# Patient Record
Sex: FEMALE | Race: WHITE | NOT HISPANIC OR LATINO
[De-identification: names, ages, dates, MRNs, and addresses within clinical notes are randomized per-mention and may not be internally consistent; named-entity substitution may affect disease eponyms.]

---

## 2021-05-05 PROBLEM — Z86.79 HISTORY OF HYPERTENSION: Status: RESOLVED | Noted: 2021-05-05 | Resolved: 2021-05-05

## 2021-05-05 RX ORDER — ASPIRIN 81 MG
81 TABLET, DELAYED RELEASE (ENTERIC COATED) ORAL
Refills: 0 | Status: ACTIVE | COMMUNITY

## 2021-05-05 RX ORDER — HYDROCHLOROTHIAZIDE 25 MG/1
25 TABLET ORAL
Refills: 0 | Status: ACTIVE | COMMUNITY

## 2021-05-05 RX ORDER — LANSOPRAZOLE 30 MG/1
30 CAPSULE, DELAYED RELEASE ORAL
Refills: 0 | Status: ACTIVE | COMMUNITY

## 2021-05-07 ENCOUNTER — APPOINTMENT (OUTPATIENT)
Dept: CARDIOTHORACIC SURGERY | Facility: CLINIC | Age: 56
End: 2021-05-07
Payer: COMMERCIAL

## 2021-05-07 ENCOUNTER — NON-APPOINTMENT (OUTPATIENT)
Age: 56
End: 2021-05-07

## 2021-05-07 VITALS
SYSTOLIC BLOOD PRESSURE: 120 MMHG | HEIGHT: 61 IN | TEMPERATURE: 97.7 F | WEIGHT: 163 LBS | DIASTOLIC BLOOD PRESSURE: 70 MMHG | BODY MASS INDEX: 30.78 KG/M2

## 2021-05-07 DIAGNOSIS — Z86.79 PERSONAL HISTORY OF OTHER DISEASES OF THE CIRCULATORY SYSTEM: ICD-10-CM

## 2021-05-07 DIAGNOSIS — Z00.00 ENCOUNTER FOR GENERAL ADULT MEDICAL EXAMINATION W/OUT ABNORMAL FINDINGS: ICD-10-CM

## 2021-05-07 PROCEDURE — 93000 ELECTROCARDIOGRAM COMPLETE: CPT

## 2021-05-07 PROCEDURE — 99072 ADDL SUPL MATRL&STAF TM PHE: CPT

## 2021-05-07 PROCEDURE — 99204 OFFICE O/P NEW MOD 45 MIN: CPT

## 2021-05-11 NOTE — HISTORY OF PRESENT ILLNESS
[FreeTextEntry1] : 55 year old female, with PMHx HTN, presented to cardiologist Dr. 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 She is referred for consultation by Dr. Gomez. She reports SOB with exertion, occasional chest pain with walking a flight of stairs or a long hallway over the past few months. She also endorses leg swelling and dizziness. She denies recent falls. Workup revealed stress echocardiography that revealed Jordan protocol for [id number], 7.5METS, terminated due to dyspnea, -2mm horizaontal to downsloping ST depressions, with nml LV function/regional wall motion abnormalities. TTE revealed nml cardiac structure, nml LV/RV size and function, no valvular pathology, unable to assess PA pressure due to lack of TR. Coronary CTA showed a small amount of calcification in the proximal LAD without HD significant stenosis, nml remainder of coronary arteries, 1.4cm area of mid LAD myocardial bridge. 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 She lives in a house with her  in [place] and has plans to move to a new house in [facility]. She remains independent with all ADLs.

## 2021-05-11 NOTE — PROCEDURE
[FreeTextEntry1] : 55 year old female, with PMHx HTN who presents with exertional SOB/chest discomfort, palpitations. Workup revealed stress echocardiography that revealed Jordan protocol for [id number], 7.5METS, terminated due to dyspnea, -2mm horizaontal to downsloping ST depressions, with nml LV function/regional wall motion abnormalities. TTE revealed nml cardiac structure, nml LV/RV size and function, no valvular pathology, unable to assess PA pressure due to lack of TR. Coronary CTA showed a small amount of calcification in the proximal LAD without HD significant stenosis, nml remainder of coronary arteries, 1.4cm area of mid LAD myocardial bridge. Presence of mid LAD myocardial bridge is identified on CTA without physiological evidence of ischemia on stress echo. She remains limited but unclear if related to current findings. \par -will send CTA coronary for FFR analysis as a second evaluation for physiological significance\par -MCOT ordered for evaluation of palpitation and monitoring of HR\par -if HR allows, would consider holding losartan and titrate dose of BB\par -pulmonary evaluation\par -f/u after above; will discuss further with referring cardiologist/PMD\par \par I personally performed the services described in the documentation and reviewed the documented recorded by the scribe in my presence; it accurately and completely records my words and actions.

## 2021-06-01 ENCOUNTER — APPOINTMENT (OUTPATIENT)
Dept: HEART AND VASCULAR | Facility: CLINIC | Age: 56
End: 2021-06-01
Payer: COMMERCIAL

## 2021-06-01 PROCEDURE — 99072 ADDL SUPL MATRL&STAF TM PHE: CPT

## 2021-06-01 PROCEDURE — 93228 REMOTE 30 DAY ECG REV/REPORT: CPT

## 2021-06-15 ENCOUNTER — RESULT REVIEW (OUTPATIENT)
Age: 56
End: 2021-06-15

## 2021-06-16 ENCOUNTER — OUTPATIENT (OUTPATIENT)
Dept: OUTPATIENT SERVICES | Facility: HOSPITAL | Age: 56
LOS: 1 days | End: 2021-06-16
Payer: COMMERCIAL

## 2021-06-16 PROCEDURE — 0502T: CPT

## 2021-06-16 PROCEDURE — 0503T: CPT

## 2021-06-16 PROCEDURE — 0504T: CPT

## 2021-07-02 ENCOUNTER — APPOINTMENT (OUTPATIENT)
Dept: CARDIOTHORACIC SURGERY | Facility: CLINIC | Age: 56
End: 2021-07-02
Payer: COMMERCIAL

## 2021-07-02 PROCEDURE — 99443: CPT

## 2021-07-08 NOTE — HISTORY OF PRESENT ILLNESS
[FreeTextEntry1] : \par This visit was provided via telehealth using real-time 2-way audio visual technology. The patient, LOVELY REILLY, was located at home, [address] [place] [address] , at the time of the visit. The provider was located at [address] [place] [address]. The patient, LOVELY REILLY, Dr. Matt Laura all participated in the telehealth encounter. Verbal consent given on 07/2/2021 by LOVELY REILLY.\par \par \par \par 55 year old female, with PMHx HTN, myocardial bridge who presents for follow up after testing

## 2021-07-08 NOTE — REVIEW OF SYSTEMS
[Shortness Of Breath] : shortness of breath [Negative] : Heme/Lymph [FreeTextEntry5] : expertional chest discomfort, occasional palpitations

## 2021-09-01 ENCOUNTER — NON-APPOINTMENT (OUTPATIENT)
Age: 56
End: 2021-09-01

## 2021-11-16 ENCOUNTER — NON-APPOINTMENT (OUTPATIENT)
Age: 56
End: 2021-11-16

## 2021-11-16 VITALS
HEART RATE: 61 BPM | SYSTOLIC BLOOD PRESSURE: 145 MMHG | RESPIRATION RATE: 19 BRPM | WEIGHT: 149.91 LBS | HEIGHT: 61 IN | TEMPERATURE: 99 F | DIASTOLIC BLOOD PRESSURE: 69 MMHG | OXYGEN SATURATION: 99 %

## 2021-11-17 ENCOUNTER — INPATIENT (INPATIENT)
Facility: HOSPITAL | Age: 56
LOS: 0 days | Discharge: HOME | DRG: 287 | End: 2021-11-18
Attending: INTERNAL MEDICINE | Admitting: INTERNAL MEDICINE
Payer: COMMERCIAL

## 2021-11-17 ENCOUNTER — APPOINTMENT (OUTPATIENT)
Age: 56
End: 2021-11-17

## 2021-11-17 DIAGNOSIS — Z96.659 PRESENCE OF UNSPECIFIED ARTIFICIAL KNEE JOINT: Chronic | ICD-10-CM

## 2021-11-17 DIAGNOSIS — Z90.49 ACQUIRED ABSENCE OF OTHER SPECIFIED PARTS OF DIGESTIVE TRACT: Chronic | ICD-10-CM

## 2021-11-17 DIAGNOSIS — Z90.722 ACQUIRED ABSENCE OF OVARIES, BILATERAL: Chronic | ICD-10-CM

## 2021-11-17 DIAGNOSIS — Z90.3 ACQUIRED ABSENCE OF STOMACH [PART OF]: Chronic | ICD-10-CM

## 2021-11-17 DIAGNOSIS — Z90.710 ACQUIRED ABSENCE OF BOTH CERVIX AND UTERUS: Chronic | ICD-10-CM

## 2021-11-17 DIAGNOSIS — Z98.891 HISTORY OF UTERINE SCAR FROM PREVIOUS SURGERY: Chronic | ICD-10-CM

## 2021-11-17 DIAGNOSIS — Z98.890 OTHER SPECIFIED POSTPROCEDURAL STATES: Chronic | ICD-10-CM

## 2021-11-17 LAB
ALBUMIN SERPL ELPH-MCNC: 4 G/DL — SIGNIFICANT CHANGE UP (ref 3.3–5)
ALBUMIN SERPL ELPH-MCNC: 4.8 G/DL — SIGNIFICANT CHANGE UP (ref 3.3–5)
ALP SERPL-CCNC: 100 U/L — SIGNIFICANT CHANGE UP (ref 40–120)
ALP SERPL-CCNC: 83 U/L — SIGNIFICANT CHANGE UP (ref 40–120)
ALT FLD-CCNC: 11 U/L — SIGNIFICANT CHANGE UP (ref 10–45)
ALT FLD-CCNC: 9 U/L — LOW (ref 10–45)
ANION GAP SERPL CALC-SCNC: 14 MMOL/L — SIGNIFICANT CHANGE UP (ref 5–17)
ANION GAP SERPL CALC-SCNC: 14 MMOL/L — SIGNIFICANT CHANGE UP (ref 5–17)
APTT BLD: 32.4 SEC — SIGNIFICANT CHANGE UP (ref 27.5–35.5)
APTT BLD: 34.2 SEC — SIGNIFICANT CHANGE UP (ref 27.5–35.5)
AST SERPL-CCNC: 13 U/L — SIGNIFICANT CHANGE UP (ref 10–40)
AST SERPL-CCNC: 18 U/L — SIGNIFICANT CHANGE UP (ref 10–40)
BILIRUB SERPL-MCNC: 0.6 MG/DL — SIGNIFICANT CHANGE UP (ref 0.2–1.2)
BILIRUB SERPL-MCNC: 0.6 MG/DL — SIGNIFICANT CHANGE UP (ref 0.2–1.2)
BLD GP AB SCN SERPL QL: NEGATIVE — SIGNIFICANT CHANGE UP
BUN SERPL-MCNC: 11 MG/DL — SIGNIFICANT CHANGE UP (ref 7–23)
BUN SERPL-MCNC: 12 MG/DL — SIGNIFICANT CHANGE UP (ref 7–23)
CALCIUM SERPL-MCNC: 10.1 MG/DL — SIGNIFICANT CHANGE UP (ref 8.4–10.5)
CALCIUM SERPL-MCNC: 9.2 MG/DL — SIGNIFICANT CHANGE UP (ref 8.4–10.5)
CHLORIDE SERPL-SCNC: 101 MMOL/L — SIGNIFICANT CHANGE UP (ref 96–108)
CHLORIDE SERPL-SCNC: 104 MMOL/L — SIGNIFICANT CHANGE UP (ref 96–108)
CO2 SERPL-SCNC: 23 MMOL/L — SIGNIFICANT CHANGE UP (ref 22–31)
CO2 SERPL-SCNC: 26 MMOL/L — SIGNIFICANT CHANGE UP (ref 22–31)
CREAT SERPL-MCNC: 0.81 MG/DL — SIGNIFICANT CHANGE UP (ref 0.5–1.3)
CREAT SERPL-MCNC: 0.81 MG/DL — SIGNIFICANT CHANGE UP (ref 0.5–1.3)
GLUCOSE SERPL-MCNC: 101 MG/DL — HIGH (ref 70–99)
GLUCOSE SERPL-MCNC: 95 MG/DL — SIGNIFICANT CHANGE UP (ref 70–99)
HCT VFR BLD CALC: 32.5 % — LOW (ref 34.5–45)
HCT VFR BLD CALC: 38.1 % — SIGNIFICANT CHANGE UP (ref 34.5–45)
HGB BLD-MCNC: 11 G/DL — LOW (ref 11.5–15.5)
HGB BLD-MCNC: 12.8 G/DL — SIGNIFICANT CHANGE UP (ref 11.5–15.5)
INR BLD: 1 — SIGNIFICANT CHANGE UP (ref 0.88–1.16)
INR BLD: 1.09 — SIGNIFICANT CHANGE UP (ref 0.88–1.16)
MAGNESIUM SERPL-MCNC: 1.9 MG/DL — SIGNIFICANT CHANGE UP (ref 1.6–2.6)
MAGNESIUM SERPL-MCNC: 2 MG/DL — SIGNIFICANT CHANGE UP (ref 1.6–2.6)
MCHC RBC-ENTMCNC: 28.9 PG — SIGNIFICANT CHANGE UP (ref 27–34)
MCHC RBC-ENTMCNC: 29.1 PG — SIGNIFICANT CHANGE UP (ref 27–34)
MCHC RBC-ENTMCNC: 33.6 GM/DL — SIGNIFICANT CHANGE UP (ref 32–36)
MCHC RBC-ENTMCNC: 33.8 GM/DL — SIGNIFICANT CHANGE UP (ref 32–36)
MCV RBC AUTO: 86 FL — SIGNIFICANT CHANGE UP (ref 80–100)
MCV RBC AUTO: 86 FL — SIGNIFICANT CHANGE UP (ref 80–100)
NRBC # BLD: 0 /100 WBCS — SIGNIFICANT CHANGE UP (ref 0–0)
NRBC # BLD: 0 /100 WBCS — SIGNIFICANT CHANGE UP (ref 0–0)
NT-PROBNP SERPL-SCNC: 103 PG/ML — SIGNIFICANT CHANGE UP (ref 0–300)
PHOSPHATE SERPL-MCNC: 3.7 MG/DL — SIGNIFICANT CHANGE UP (ref 2.5–4.5)
PHOSPHATE SERPL-MCNC: 4 MG/DL — SIGNIFICANT CHANGE UP (ref 2.5–4.5)
PLATELET # BLD AUTO: 202 K/UL — SIGNIFICANT CHANGE UP (ref 150–400)
PLATELET # BLD AUTO: 250 K/UL — SIGNIFICANT CHANGE UP (ref 150–400)
POTASSIUM SERPL-MCNC: 3.3 MMOL/L — LOW (ref 3.5–5.3)
POTASSIUM SERPL-MCNC: 3.7 MMOL/L — SIGNIFICANT CHANGE UP (ref 3.5–5.3)
POTASSIUM SERPL-SCNC: 3.3 MMOL/L — LOW (ref 3.5–5.3)
POTASSIUM SERPL-SCNC: 3.7 MMOL/L — SIGNIFICANT CHANGE UP (ref 3.5–5.3)
PROT SERPL-MCNC: 6.4 G/DL — SIGNIFICANT CHANGE UP (ref 6–8.3)
PROT SERPL-MCNC: 7.8 G/DL — SIGNIFICANT CHANGE UP (ref 6–8.3)
PROTHROM AB SERPL-ACNC: 12 SEC — SIGNIFICANT CHANGE UP (ref 10.6–13.6)
PROTHROM AB SERPL-ACNC: 13 SEC — SIGNIFICANT CHANGE UP (ref 10.6–13.6)
RBC # BLD: 3.78 M/UL — LOW (ref 3.8–5.2)
RBC # BLD: 4.43 M/UL — SIGNIFICANT CHANGE UP (ref 3.8–5.2)
RBC # FLD: 13 % — SIGNIFICANT CHANGE UP (ref 10.3–14.5)
RBC # FLD: 13 % — SIGNIFICANT CHANGE UP (ref 10.3–14.5)
RH IG SCN BLD-IMP: POSITIVE — SIGNIFICANT CHANGE UP
SODIUM SERPL-SCNC: 141 MMOL/L — SIGNIFICANT CHANGE UP (ref 135–145)
SODIUM SERPL-SCNC: 141 MMOL/L — SIGNIFICANT CHANGE UP (ref 135–145)
WBC # BLD: 5.56 K/UL — SIGNIFICANT CHANGE UP (ref 3.8–10.5)
WBC # BLD: 6.36 K/UL — SIGNIFICANT CHANGE UP (ref 3.8–10.5)
WBC # FLD AUTO: 5.56 K/UL — SIGNIFICANT CHANGE UP (ref 3.8–10.5)
WBC # FLD AUTO: 6.36 K/UL — SIGNIFICANT CHANGE UP (ref 3.8–10.5)

## 2021-11-17 PROCEDURE — 71045 X-RAY EXAM CHEST 1 VIEW: CPT | Mod: 26

## 2021-11-17 PROCEDURE — 93460 R&L HRT ART/VENTRICLE ANGIO: CPT | Mod: 26

## 2021-11-17 PROCEDURE — 99223 1ST HOSP IP/OBS HIGH 75: CPT | Mod: 25

## 2021-11-17 RX ORDER — METOPROLOL TARTRATE 50 MG
50 TABLET ORAL DAILY
Refills: 0 | Status: DISCONTINUED | OUTPATIENT
Start: 2021-11-17 | End: 2021-11-18

## 2021-11-17 RX ORDER — ACETAMINOPHEN 500 MG
650 TABLET ORAL EVERY 6 HOURS
Refills: 0 | Status: DISCONTINUED | OUTPATIENT
Start: 2021-11-17 | End: 2021-11-18

## 2021-11-17 RX ORDER — ASPIRIN/CALCIUM CARB/MAGNESIUM 324 MG
81 TABLET ORAL DAILY
Refills: 0 | Status: DISCONTINUED | OUTPATIENT
Start: 2021-11-17 | End: 2021-11-18

## 2021-11-17 RX ORDER — ASPIRIN/CALCIUM CARB/MAGNESIUM 324 MG
1 TABLET ORAL
Qty: 0 | Refills: 0 | DISCHARGE

## 2021-11-17 RX ORDER — PANTOPRAZOLE SODIUM 20 MG/1
40 TABLET, DELAYED RELEASE ORAL
Refills: 0 | Status: DISCONTINUED | OUTPATIENT
Start: 2021-11-17 | End: 2021-11-18

## 2021-11-17 RX ORDER — HEPARIN SODIUM 5000 [USP'U]/ML
5000 INJECTION INTRAVENOUS; SUBCUTANEOUS EVERY 8 HOURS
Refills: 0 | Status: DISCONTINUED | OUTPATIENT
Start: 2021-11-17 | End: 2021-11-18

## 2021-11-17 RX ORDER — ASPIRIN/CALCIUM CARB/MAGNESIUM 324 MG
324 TABLET ORAL ONCE
Refills: 0 | Status: COMPLETED | OUTPATIENT
Start: 2021-11-17 | End: 2021-11-17

## 2021-11-17 RX ORDER — LANSOPRAZOLE 15 MG/1
30 CAPSULE, DELAYED RELEASE ORAL
Qty: 0 | Refills: 0 | DISCHARGE

## 2021-11-17 RX ORDER — METOPROLOL TARTRATE 50 MG
1 TABLET ORAL
Qty: 0 | Refills: 0 | DISCHARGE

## 2021-11-17 RX ORDER — SODIUM CHLORIDE 9 MG/ML
1000 INJECTION, SOLUTION INTRAVENOUS
Refills: 0 | Status: DISCONTINUED | OUTPATIENT
Start: 2021-11-17 | End: 2021-11-18

## 2021-11-17 RX ORDER — ONDANSETRON 8 MG/1
4 TABLET, FILM COATED ORAL ONCE
Refills: 0 | Status: DISCONTINUED | OUTPATIENT
Start: 2021-11-17 | End: 2021-11-18

## 2021-11-17 RX ADMIN — Medication 324 MILLIGRAM(S): at 19:26

## 2021-11-17 NOTE — H&P ADULT - NSICDXPASTSURGICALHX_GEN_ALL_CORE_FT
PAST SURGICAL HISTORY:  H/O bilateral oophorectomy     H/O gastric sleeve     H/O total knee replacement     History of ankle surgery     S/P  section     S/P cholecystectomy     S/P SHAMA (total abdominal hysterectomy)

## 2021-11-17 NOTE — H&P ADULT - NSHPREVIEWOFSYSTEMS_GEN_ALL_CORE
Review of Systems  CONSTITUTIONAL:  Denies Fevers / chills, sweats, fatigue, weight loss, weight gain                                      NEURO:  Denies paresthesias, seizures, syncope, confusion                                                                                EYES:  Denies Blurry vision, discharge, pain, loss of vision                                                                                    ENMT:  Denies Difficulty hearing, vertigo, dysphagia, epistaxis, recent dental work                                       CV:  Denies Chest pain, palpitations, ALBARADO, orthopnea                                                                                          RESPIRATORY:  Denies Wheezing, SOB, cough / sputum, hemoptysis                                                                GI:  Denies Nausea, vomiting, diarrhea, constipation, melena, difficulty swallowing                                               : Denies Hematuria, dysuria, urgency, incontinence                                                                                         MUSCULOSKELETAL:  Denies arthritis, joint swelling, muscle weakness                                                             SKIN/BREAST:  Denies rash, itching, hair loss, masses                                                                                            PSYCH:  Denies depression, anxiety, suicidal ideation                                                                                               HEME/LYMPH:  Denies bruises easily, enlarged lymph nodes, tender lymph nodes                                        ENDOCRINE:  Denies cold intolerance, heat intolerance, polydipsia

## 2021-11-17 NOTE — H&P ADULT - NSHPPHYSICALEXAM_GEN_ALL_CORE
Physical Exam  VS Afebrile, /55, HR 88 reg, 100% RA   CONSTITUTIONAL:                                                              NAD  NEURO:                                                                     A&O x 3                  EYES:                                                                                WNL  ENMT:                                                                               WNL  CV:                                                                                  RRR   RESPIRATORY:                                                                 CTA bilaterally   GI:                                                                                    soft, non-tender   : [place] + / -                                                                  WNL  MUSCULOSKELETAL:                                                       WNL  SKIN / BREAST:                                                                  WNL

## 2021-11-17 NOTE — BRIEF OPERATIVE NOTE - OPERATION/FINDINGS
radial cath - radial band to be d/c at midnight   no CAD - normal coronaries   normal PA pressures  small myocardial bridge   f/u with Keya   d/c home tomorrow

## 2021-11-17 NOTE — H&P ADULT - ASSESSMENT
56 y/o female, PMHx HTN, presented to cardiologist, Dr. Tessa Stockton for evaluation of SOB, LE edema, and coronary CTA results of myocardial bridge in the mid LAD. Pt referred for consultation by Dr. Gomez. Pt reports SOB with exertion, chest pain with stairs and walking x few months. Pt c/o LE edema and dizziness. Workup revealed stress echocardiography that revealed Jordan protocol for 6min 14 sec 7.5 METS, terminated due to dyspnea, -2mm horizontal to downsloping ST depressions, with normal LV function/regional wall motion abnormalities. TTE revealed normal cardiac structure, normal LV/RV size and function, no valvular pathology, unable to assess PA pressure due to lack of TR. Coronary CTA showed small amount of calcifications in the proximal LAD without HD significant stenosis, normal remainder of coronary arteries, 1.4cm area of mid LAD myocardial bridging.   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    Plan:  Problem 1: Myocardial Bridge   - R/LHC today, FFR of LAD, +/- Provocative Test   - Continue ASA/BB after procedure     Problem 2: HTN  - Resume HCTZ, Losartan, Metoprolol post op       Problem 3: GERD  - restart home Lansoprazole    I have reviewed clinical labs tests and reports, radiology tests and reports, as well as old patient medical records, and discussed with the referring physician.

## 2021-11-17 NOTE — H&P ADULT - HISTORY OF PRESENT ILLNESS
54 y/o female, PMHx HTN, presented to cardiologist, Dr. Tessa Stockton for evaluation of SOB, LE edema, and coronary CTA results of myocardial bridge in the mid LAD. Pt referred for consultation by Dr. Gomez. Pt reports SOB with exertion, chest pain with stairs and walking x few months. Pt c/o LE edema and dizziness. Workup revealed stress echocardiography that revealed Jordan protocol for 6min 14 sec 7.5 METS, terminated due to dyspnea, -2mm horizontal to downsloping ST depressions, with normal LV function/regional wall motion abnormalities. TTE revealed normal cardiac structure, normal LV/RV size and function, no valvular pathology, unable to assess PA pressure due to lack of TR. Coronary CTA showed small amount of calcifications in the proximal LAD without HD significant stenosis, normal remainder of coronary arteries, 1.4cm area of mid LAD myocardial bridging.   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    Pt seen/examined day of procedure. Due to OR delays and case starting later than planned, patient will be admitted to [facility] overnight and discharged in the morning. Pt has been NPO since midnight. No recent fevers, chills, illnesses, rashes. Pt feels well.  No chest pain, SOB.

## 2021-11-17 NOTE — BRIEF OPERATIVE NOTE - NSICDXBRIEFPROCEDURE_GEN_ALL_CORE_FT
PROCEDURES:  Left and right heart catheterization 17-Nov-2021 21:31:44  Néstor Mayo  Fractional flow reserve (FFR) measurement of initial coronary vessel 17-Nov-2021 21:31:55  Néstor Mayo

## 2021-11-17 NOTE — H&P ADULT - ATTENDING COMMENTS
Patient seen and examined with PA/fellow bedside and discussed during rounds.  PA/fellow note read, including vitals, physical findings, laboratory data, and radiological reports.   Revisions included below. Direct personal management at bedside and extensive interpretation of the data performed.  Plan was outlined and discussed in details with the multidisciplinary team.  Decision making of high complexity.     54 y/o female, PMHx HTN, presented to cardiologist, Dr. Tessa Stockton for evaluation of SOB, LE edema, and coronary CTA results of myocardial bridge in the mid LAD. Pt reports SOB with exertion, chest pain with stairs and walking x few months. Pt c/o LE edema and dizziness. Workup revealed stress echocardiography that revealed Jordan protocol for 6min 14 sec 7.5 METS, terminated due to dyspnea, -2mm horizontal to downsloping ST depressions, with normal LV function/regional wall motion abnormalities. TTE revealed normal cardiac structure, normal LV/RV size and function, no valvular pathology, unable to assess PA pressure due to lack of TR. Coronary CTA showed small amount of calcifications in the proximal LAD without HD significant stenosis, normal remainder of coronary arteries, 1.4cm area of mid LAD myocardial bridging. Patient presents for cardiac catheterization given continued sx. Discussed risks/benefits/alternatives to cardiac cath with possible FFR/provacative testing, informed consent obtained.    I was physically present for the key portions of the evaluation and management (E/M) service provided.  I agree with the above history, physical, and plan which I have reviewed and edited where appropriate.     80 minutes spent on total encounter; more than 50% of the visit was spent counseling and/or coordinating care by the attending physician.

## 2021-11-17 NOTE — H&P ADULT - NSHPSOCIALHISTORY_GEN_ALL_CORE
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

## 2021-11-18 ENCOUNTER — TRANSCRIPTION ENCOUNTER (OUTPATIENT)
Age: 56
End: 2021-11-18

## 2021-11-18 VITALS — TEMPERATURE: 98 F

## 2021-11-18 LAB
ALBUMIN SERPL ELPH-MCNC: 4.2 G/DL — SIGNIFICANT CHANGE UP (ref 3.3–5)
ALP SERPL-CCNC: 80 U/L — SIGNIFICANT CHANGE UP (ref 40–120)
ALT FLD-CCNC: 9 U/L — LOW (ref 10–45)
ANION GAP SERPL CALC-SCNC: 10 MMOL/L — SIGNIFICANT CHANGE UP (ref 5–17)
APTT BLD: 31.7 SEC — SIGNIFICANT CHANGE UP (ref 27.5–35.5)
AST SERPL-CCNC: 12 U/L — SIGNIFICANT CHANGE UP (ref 10–40)
BILIRUB SERPL-MCNC: 0.6 MG/DL — SIGNIFICANT CHANGE UP (ref 0.2–1.2)
BUN SERPL-MCNC: 13 MG/DL — SIGNIFICANT CHANGE UP (ref 7–23)
CALCIUM SERPL-MCNC: 9.2 MG/DL — SIGNIFICANT CHANGE UP (ref 8.4–10.5)
CHLORIDE SERPL-SCNC: 104 MMOL/L — SIGNIFICANT CHANGE UP (ref 96–108)
CO2 SERPL-SCNC: 26 MMOL/L — SIGNIFICANT CHANGE UP (ref 22–31)
COVID-19 NUCLEOCAPSID GAM AB INTERP: NEGATIVE — SIGNIFICANT CHANGE UP
COVID-19 NUCLEOCAPSID TOTAL GAM ANTIBODY RESULT: 0.08 INDEX — SIGNIFICANT CHANGE UP
COVID-19 SPIKE DOMAIN AB INTERP: POSITIVE
COVID-19 SPIKE DOMAIN ANTIBODY RESULT: 167 U/ML — HIGH
CREAT SERPL-MCNC: 0.82 MG/DL — SIGNIFICANT CHANGE UP (ref 0.5–1.3)
GLUCOSE SERPL-MCNC: 93 MG/DL — SIGNIFICANT CHANGE UP (ref 70–99)
HCT VFR BLD CALC: 33.6 % — LOW (ref 34.5–45)
HCV AB S/CO SERPL IA: 0.04 S/CO — SIGNIFICANT CHANGE UP
HCV AB SERPL-IMP: SIGNIFICANT CHANGE UP
HGB BLD-MCNC: 10.8 G/DL — LOW (ref 11.5–15.5)
INR BLD: 1.09 — SIGNIFICANT CHANGE UP (ref 0.88–1.16)
MAGNESIUM SERPL-MCNC: 2 MG/DL — SIGNIFICANT CHANGE UP (ref 1.6–2.6)
MCHC RBC-ENTMCNC: 28 PG — SIGNIFICANT CHANGE UP (ref 27–34)
MCHC RBC-ENTMCNC: 32.1 GM/DL — SIGNIFICANT CHANGE UP (ref 32–36)
MCV RBC AUTO: 87 FL — SIGNIFICANT CHANGE UP (ref 80–100)
NRBC # BLD: 0 /100 WBCS — SIGNIFICANT CHANGE UP (ref 0–0)
NT-PROBNP SERPL-SCNC: 83 PG/ML — SIGNIFICANT CHANGE UP (ref 0–300)
PHOSPHATE SERPL-MCNC: 4.5 MG/DL — SIGNIFICANT CHANGE UP (ref 2.5–4.5)
PLATELET # BLD AUTO: 202 K/UL — SIGNIFICANT CHANGE UP (ref 150–400)
POTASSIUM SERPL-MCNC: 3.8 MMOL/L — SIGNIFICANT CHANGE UP (ref 3.5–5.3)
POTASSIUM SERPL-SCNC: 3.8 MMOL/L — SIGNIFICANT CHANGE UP (ref 3.5–5.3)
PROT SERPL-MCNC: 6.4 G/DL — SIGNIFICANT CHANGE UP (ref 6–8.3)
PROTHROM AB SERPL-ACNC: 13 SEC — SIGNIFICANT CHANGE UP (ref 10.6–13.6)
RBC # BLD: 3.86 M/UL — SIGNIFICANT CHANGE UP (ref 3.8–5.2)
RBC # FLD: 13.2 % — SIGNIFICANT CHANGE UP (ref 10.3–14.5)
SARS-COV-2 IGG+IGM SERPL QL IA: 0.08 INDEX — SIGNIFICANT CHANGE UP
SARS-COV-2 IGG+IGM SERPL QL IA: 167 U/ML — HIGH
SARS-COV-2 IGG+IGM SERPL QL IA: NEGATIVE — SIGNIFICANT CHANGE UP
SARS-COV-2 IGG+IGM SERPL QL IA: POSITIVE
SODIUM SERPL-SCNC: 140 MMOL/L — SIGNIFICANT CHANGE UP (ref 135–145)
WBC # BLD: 4.53 K/UL — SIGNIFICANT CHANGE UP (ref 3.8–10.5)
WBC # FLD AUTO: 4.53 K/UL — SIGNIFICANT CHANGE UP (ref 3.8–10.5)

## 2021-11-18 PROCEDURE — 85610 PROTHROMBIN TIME: CPT

## 2021-11-18 PROCEDURE — 83880 ASSAY OF NATRIURETIC PEPTIDE: CPT

## 2021-11-18 PROCEDURE — 36415 COLL VENOUS BLD VENIPUNCTURE: CPT

## 2021-11-18 PROCEDURE — 80053 COMPREHEN METABOLIC PANEL: CPT

## 2021-11-18 PROCEDURE — C1894: CPT

## 2021-11-18 PROCEDURE — 84100 ASSAY OF PHOSPHORUS: CPT

## 2021-11-18 PROCEDURE — 99231 SBSQ HOSP IP/OBS SF/LOW 25: CPT

## 2021-11-18 PROCEDURE — 99238 HOSP IP/OBS DSCHRG MGMT 30/<: CPT

## 2021-11-18 PROCEDURE — 86850 RBC ANTIBODY SCREEN: CPT

## 2021-11-18 PROCEDURE — C1889: CPT

## 2021-11-18 PROCEDURE — 85730 THROMBOPLASTIN TIME PARTIAL: CPT

## 2021-11-18 PROCEDURE — 86803 HEPATITIS C AB TEST: CPT

## 2021-11-18 PROCEDURE — C1769: CPT

## 2021-11-18 PROCEDURE — 86901 BLOOD TYPING SEROLOGIC RH(D): CPT

## 2021-11-18 PROCEDURE — 86769 SARS-COV-2 COVID-19 ANTIBODY: CPT

## 2021-11-18 PROCEDURE — 86900 BLOOD TYPING SEROLOGIC ABO: CPT

## 2021-11-18 PROCEDURE — 85027 COMPLETE CBC AUTOMATED: CPT

## 2021-11-18 PROCEDURE — 83735 ASSAY OF MAGNESIUM: CPT

## 2021-11-18 PROCEDURE — C1887: CPT

## 2021-11-18 PROCEDURE — 71045 X-RAY EXAM CHEST 1 VIEW: CPT

## 2021-11-18 RX ORDER — MAGNESIUM OXIDE 400 MG ORAL TABLET 241.3 MG
400 TABLET ORAL ONCE
Refills: 0 | Status: COMPLETED | OUTPATIENT
Start: 2021-11-18 | End: 2021-11-18

## 2021-11-18 RX ORDER — ACETAMINOPHEN 500 MG
1000 TABLET ORAL ONCE
Refills: 0 | Status: COMPLETED | OUTPATIENT
Start: 2021-11-18 | End: 2021-11-18

## 2021-11-18 RX ORDER — POTASSIUM CHLORIDE 20 MEQ
40 PACKET (EA) ORAL ONCE
Refills: 0 | Status: COMPLETED | OUTPATIENT
Start: 2021-11-18 | End: 2021-11-18

## 2021-11-18 RX ADMIN — SODIUM CHLORIDE 75 MILLILITER(S): 9 INJECTION, SOLUTION INTRAVENOUS at 01:09

## 2021-11-18 RX ADMIN — Medication 1000 MILLIGRAM(S): at 08:07

## 2021-11-18 RX ADMIN — Medication 400 MILLIGRAM(S): at 06:44

## 2021-11-18 RX ADMIN — MAGNESIUM OXIDE 400 MG ORAL TABLET 400 MILLIGRAM(S): 241.3 TABLET ORAL at 00:53

## 2021-11-18 RX ADMIN — Medication 40 MILLIEQUIVALENT(S): at 00:53

## 2021-11-18 RX ADMIN — HEPARIN SODIUM 5000 UNIT(S): 5000 INJECTION INTRAVENOUS; SUBCUTANEOUS at 06:44

## 2021-11-18 RX ADMIN — Medication 50 MILLIGRAM(S): at 06:45

## 2021-11-18 RX ADMIN — HEPARIN SODIUM 5000 UNIT(S): 5000 INJECTION INTRAVENOUS; SUBCUTANEOUS at 00:48

## 2021-11-18 RX ADMIN — PANTOPRAZOLE SODIUM 40 MILLIGRAM(S): 20 TABLET, DELAYED RELEASE ORAL at 06:45

## 2021-11-18 NOTE — DISCHARGE NOTE NURSING/CASE MANAGEMENT/SOCIAL WORK - PATIENT PORTAL LINK FT
You can access the FollowMyHealth Patient Portal offered by [facility] by registering at the following website: [email or web address]. By joining [facility]’s FollowMyHealth portal, you will also be able to view your health information using other applications (apps) compatible with our system.

## 2021-11-18 NOTE — DISCHARGE NOTE PROVIDER - CARE PROVIDER_API CALL
Matt Laura)  Cardiology; Interventional Cardiology  [address], [address]th [place] [address]  Phone: [phone]  Fax: [phone]  Follow Up Time:    Matt Laura)  Cardiology; Interventional Cardiology  [address], [address]th [place] [address]  Phone: [phone]  Fax: [phone]  Follow Up Time: 1 week

## 2021-11-18 NOTE — DISCHARGE NOTE PROVIDER - NSDCFUADDAPPT_GEN_ALL_CORE_FT
-Please follow up with Dr. Laura on ___.  The office is located at [facility], 4th floor. Call us with any questions #[phone].      -Walk daily as tolerated and use your incentive spirometer every hour.    -No driving or strenuous activity/exercise for 6 weeks, or until cleared by your surgeon.     -Gently clean your incisions with anti-bacterial soap and water, pat dry.  You may leave them open to air.    -Call your doctor if you have shortness of breath, chest pain not relieved by pain medication, dizziness, fever >101.5, or increased redness or drainage from incisions.   -Please follow up with Dr. Laura in 1-2 weeks.  The office is located at [facility], 4th floor. Call us with any questions #[phone].  Our office will call with appointment.     -Please follow up with Dr. Laura in 1-2 weeks.  The office is located at [facility], 4th floor. Call us with any questions #[phone].  Our office will call with appointment.    -Please restart hydrochlorothiazide on Friday, Nov 18th if systolic blood pressure is greater than 120.

## 2021-11-18 NOTE — DISCHARGE NOTE PROVIDER - HOSPITAL COURSE
56 y/o female, PMHx HTN, presented to cardiologist, Dr. Tessa Stockton for evaluation of SOB, LE edema, and coronary CTA results of myocardial bridge in the mid LAD. Pt referred for consultation by Dr. Gomez. Pt reports SOB with exertion, chest pain with stairs and walking x few months. Pt c/o LE edema and dizziness. Workup revealed stress echocardiography that revealed Jordan protocol for 6min 14 sec 7.5 METS, terminated due to dyspnea, -2mm horizontal to downsloping ST depressions, with normal LV function/regional wall motion abnormalities. TTE revealed normal cardiac structure, normal LV/RV size and function, no valvular pathology, unable to assess PA pressure due to lack of TR. Coronary CTA showed small amount of calcifications in the proximal LAD without HD significant stenosis, normal remainder of coronary arteries, 1.4cm area of mid LAD myocardial bridging.  On 11/187/21, she had a cardiac cath which revealed no CAD - normal coronaries, normal PA pressures, and a small myocardial bridge.  On POD 1, she is tolerating PO, voiding freely and stable for home with daughter.    Over 35 minutes was spent with the patient reviewing the discharge material including medications, follow up appointments, recovery, concerning symptoms, and how to contact their health care providers if they have questions

## 2021-11-18 NOTE — DISCHARGE NOTE PROVIDER - NSDCCPTREATMENT_GEN_ALL_CORE_FT
PRINCIPAL PROCEDURE  Procedure: Left and right heart catheterization  Findings and Treatment:        PRINCIPAL PROCEDURE  Procedure: Left and right heart catheterization  Findings and Treatment:       SECONDARY PROCEDURE  Procedure: Fractional flow reserve (FFR) measurement of initial coronary vessel  Findings and Treatment:

## 2021-11-18 NOTE — DISCHARGE NOTE PROVIDER - NSDCMRMEDTOKEN_GEN_ALL_CORE_FT
aspirin 81 mg oral tablet, chewable: 1 tab(s) orally once a day  hydroCHLOROthiazide 25 mg oral tablet: 1 tab(s) orally once a day  lansoprazole 30 mg oral delayed release capsule: 30 milligram(s) orally 2 times a day  metoprolol succinate 50 mg oral tablet, extended release: 1 tab(s) orally once a day   aspirin 81 mg oral tablet, chewable: 1 tab(s) orally once a day  lansoprazole 30 mg oral delayed release capsule: 30 milligram(s) orally 2 times a day  metoprolol succinate 50 mg oral tablet, extended release: 1 tab(s) orally once a day   aspirin 81 mg oral tablet, chewable: 1 tab(s) orally once a day  hydroCHLOROthiazide 25 mg oral tablet: 1 tab(s) orally once a day, restart on Friday, Nov 18th if systolic blood pressure is greater than 120  lansoprazole 30 mg oral delayed release capsule: 30 milligram(s) orally 2 times a day  metoprolol succinate 50 mg oral tablet, extended release: 1 tab(s) orally once a day

## 2021-11-18 NOTE — DISCHARGE NOTE PROVIDER - NSDCACTIVITY_GEN_ALL_CORE
Do not drive or operate machinery/Showering allowed/No heavy lifting/straining/Follow Instructions Provided by your Surgical Team Do not drive or operate machinery/Showering allowed/Stairs allowed/Walking - Indoors allowed/No heavy lifting/straining/Walking - Outdoors allowed/Follow Instructions Provided by your Surgical Team

## 2021-11-18 NOTE — DISCHARGE NOTE PROVIDER - CARE PROVIDERS DIRECT ADDRESSES
[email or web address]

## 2021-11-18 NOTE — DISCHARGE NOTE PROVIDER - NSDCFUADDINST_GEN_ALL_CORE_FT
-Walk daily as tolerated and use your incentive spirometer every hour.    -No driving or strenuous activity/exercise for 6 weeks, or until cleared by your surgeon.     -Gently clean your incisions with anti-bacterial soap and water, pat dry.  You may leave them open to air.    -Call your doctor if you have shortness of breath, chest pain not relieved by pain medication, dizziness, fever >101.5, or increased redness or drainage from incisions.

## 2021-11-18 NOTE — DISCHARGE NOTE NURSING/CASE MANAGEMENT/SOCIAL WORK - NSDCFUADDAPPT_GEN_ALL_CORE_FT
-Please follow up with Dr. Laura in 1-2 weeks.  The office is located at [facility], 4th floor. Call us with any questions #[phone].  Our office will call with appointment.    -Please restart hydrochlorothiazide on Friday, Nov 18th if systolic blood pressure is greater than 120.

## 2021-11-18 NOTE — PROGRESS NOTE ADULT - SUBJECTIVE AND OBJECTIVE BOX
Patient discussed on morning rounds with Dr. Laura    Operation / Date:     Surgeon:    Referring Physician:    SUBJECTIVE ASSESSMENT:  56y Female     Hospital Course:    Vital Signs Last 24 Hrs  T(C): 36.7 (18 Nov 2021 05:30), Max: 36.7 (18 Nov 2021 05:30)  T(F): 98 (18 Nov 2021 05:30), Max: 98 (18 Nov 2021 05:30)  HR: 58 (18 Nov 2021 05:00) (58 - 76)  BP: 120/56 (18 Nov 2021 05:00) (101/56 - 120/56)  BP(mean): 77 (18 Nov 2021 05:00) (71 - 77)  RR: 17 (18 Nov 2021 05:00) (15 - 17)  SpO2: 96% (18 Nov 2021 05:00) (96% - 97%)  I&O's Detail      EPICARDIAL WIRES REMOVED: Yes/No.  TIE DOWNS REMOVED: Yes/No.    PHYSICAL EXAM:    General:     Neurological:    Cardiovascular:    Respiratory:    Gastrointestinal:    Extremities:    Vascular:    Incision Sites:    LABS:                        10.8   4.53  )-----------( 202      ( 18 Nov 2021 06:55 )             33.6       COUMADIN:  Yes/No.        DOSE:                  INDICATION:                GOAL INR:    PT/INR - ( 18 Nov 2021 06:55 )   PT: 13.0 sec;   INR: 1.09          PTT - ( 18 Nov 2021 06:55 )  PTT:31.7 sec    11-18    140  |  104  |  13  ----------------------------<  93  3.8   |  26  |  0.82    Ca    9.2      18 Nov 2021 06:55  Phos  4.5     11-18  Mg     2.0     11-18    TPro  6.4  /  Alb  4.2  /  TBili  0.6  /  DBili  x   /  AST  12  /  ALT  9<L>  /  AlkPhos  80  11-18          MEDICATIONS  (STANDING):  aspirin enteric coated 81 milliGRAM(s) Oral daily  heparin   Injectable 5000 Unit(s) SubCutaneous every 8 hours  lactated ringers. 1000 milliLiter(s) (75 mL/Hr) IV Continuous <Continuous>  metoprolol succinate ER 50 milliGRAM(s) Oral daily  pantoprazole    Tablet 40 milliGRAM(s) Oral before breakfast      Discharge CXR:    Discharge ECHO:     Patient discussed on morning rounds with Dr. Laura    Operation / Date: 11/17 /[facility]    Surgeon: Keya    Referring Physician: Tessa Stockton     SUBJECTIVE ASSESSMENT:  56y Female seen and examined at bedside.  Patient with no complaints.  Denies chest pain, shortness of breath, nausea, vomiting.  Eager to go home    Hospital Course:  56 y/o female, PMHx HTN, presented to cardiologist, Dr. Tessa Stockton for evaluation of SOB, LE edema, and coronary CTA results of myocardial bridge in the mid LAD. Pt referred for consultation by Dr. Gomez. Pt reports SOB with exertion, chest pain with stairs and walking x few months. Pt c/o LE edema and dizziness. Workup revealed stress echocardiography that revealed Jordan protocol for 6min 14 sec 7.5 METS, terminated due to dyspnea, -2mm horizontal to downsloping ST depressions, with normal LV function/regional wall motion abnormalities. TTE revealed normal cardiac structure, normal LV/RV size and function, no valvular pathology, unable to assess PA pressure due to lack of TR. Coronary CTA showed small amount of calcifications in the proximal LAD without HD significant stenosis, normal remainder of coronary arteries, 1.4cm area of mid LAD myocardial bridging.  On 11/187/21, she had a cardiac cath which revealed no CAD - normal coronaries, normal PA pressures, and a small myocardial bridge.  On POD 1, she is tolerating PO, voiding freely and stable for home with daughter.    Vital Signs Last 24 Hrs  T(C): 36.7 (18 Nov 2021 05:30), Max: 36.7 (18 Nov 2021 05:30)  T(F): 98 (18 Nov 2021 05:30), Max: 98 (18 Nov 2021 05:30)  HR: 58 (18 Nov 2021 05:00) (58 - 76)  BP: 120/56 (18 Nov 2021 05:00) (101/56 - 120/56)  BP(mean): 77 (18 Nov 2021 05:00) (71 - 77)  RR: 17 (18 Nov 2021 05:00) (15 - 17)  SpO2: 96% (18 Nov 2021 05:00) (96% - 97%)  I&O's Detail      PHYSICAL EXAM:    GEN: NAD, looks comfortable  Psych: Mood appropriate  Neuro: A&Ox3.  No focal deficits.  Moving all extremities.   HEENT: No obvious abnormalities  CV: S1S2, regular, no murmurs appreciated.  No carotid bruits.  No JVD  Lungs: Clear B/L.  No wheezing, rales or rhonchi  ABD: Soft, non-tender, non-distended.  +Bowel sounds  EXT: Warm and well perfused.  No peripheral edema noted  Musculoskeletal: Moving all extremities with normal ROM, no joint swelling, right radial without hematoma  PV: Pedal pulses palpable      LABS:                        10.8   4.53  )-----------( 202      ( 18 Nov 2021 06:55 )             33.6       COUMADIN:  No.        DOSE:                  INDICATION:                GOAL INR:    PT/INR - ( 18 Nov 2021 06:55 )   PT: 13.0 sec;   INR: 1.09          PTT - ( 18 Nov 2021 06:55 )  PTT:31.7 sec    11-18    140  |  104  |  13  ----------------------------<  93  3.8   |  26  |  0.82    Ca    9.2      18 Nov 2021 06:55  Phos  4.5     11-18  Mg     2.0     11-18    TPro  6.4  /  Alb  4.2  /  TBili  0.6  /  DBili  x   /  AST  12  /  ALT  9<L>  /  AlkPhos  80  11-18          MEDICATIONS  (STANDING):  aspirin enteric coated 81 milliGRAM(s) Oral daily  heparin   Injectable 5000 Unit(s) SubCutaneous every 8 hours  lactated ringers. 1000 milliLiter(s) (75 mL/Hr) IV Continuous <Continuous>  metoprolol succinate ER 50 milliGRAM(s) Oral daily  pantoprazole    Tablet 40 milliGRAM(s) Oral before breakfast

## 2021-11-18 NOTE — DISCHARGE NOTE PROVIDER - NSDCCPCAREPLAN_GEN_ALL_CORE_FT
PRINCIPAL DISCHARGE DIAGNOSIS  Diagnosis: CAD (coronary artery disease)  Assessment and Plan of Treatment:       SECONDARY DISCHARGE DIAGNOSES  Diagnosis: HTN (hypertension)  Assessment and Plan of Treatment:

## 2021-11-18 NOTE — PROGRESS NOTE ADULT - ASSESSMENT
56 y/o female, PMHx HTN, presented to cardiologist, Dr. Tessa Stockton for evaluation of SOB, LE edema, and coronary CTA results of myocardial bridge in the mid LAD. Pt referred for consultation by Dr. Gomez. Pt reports SOB with exertion, chest pain with stairs and walking x few months. Pt c/o LE edema and dizziness. Workup revealed stress echocardiography that revealed Jordan protocol for 6min 14 sec 7.5 METS, terminated due to dyspnea, -2mm horizontal to downsloping ST depressions, with normal LV function/regional wall motion abnormalities. TTE revealed normal cardiac structure, normal LV/RV size and function, no valvular pathology, unable to assess PA pressure due to lack of TR. Coronary CTA showed small amount of calcifications in the proximal LAD without HD significant stenosis, normal remainder of coronary arteries, 1.4cm area of mid LAD myocardial bridging.  On 11/187/21, she had a cardiac cath which revealed no CAD - normal coronaries, normal PA pressures, and a small myocardial bridge.  On POD 1, she is tolerating PO, voiding freely and stable for home with daughter.

## 2021-11-22 DIAGNOSIS — Z88.5 ALLERGY STATUS TO NARCOTIC AGENT: ICD-10-CM

## 2021-11-22 DIAGNOSIS — I10 ESSENTIAL (PRIMARY) HYPERTENSION: ICD-10-CM

## 2021-11-22 DIAGNOSIS — Q24.5 MALFORMATION OF CORONARY VESSELS: ICD-10-CM

## 2021-11-22 DIAGNOSIS — K21.9 GASTRO-ESOPHAGEAL REFLUX DISEASE WITHOUT ESOPHAGITIS: ICD-10-CM

## 2021-11-22 DIAGNOSIS — Z79.82 LONG TERM (CURRENT) USE OF ASPIRIN: ICD-10-CM

## 2021-11-22 DIAGNOSIS — Z96.659 PRESENCE OF UNSPECIFIED ARTIFICIAL KNEE JOINT: ICD-10-CM

## 2021-11-22 DIAGNOSIS — Z88.0 ALLERGY STATUS TO PENICILLIN: ICD-10-CM

## 2021-11-29 ENCOUNTER — APPOINTMENT (OUTPATIENT)
Dept: CARDIOTHORACIC SURGERY | Facility: CLINIC | Age: 56
End: 2021-11-29
Payer: COMMERCIAL

## 2021-11-29 DIAGNOSIS — Q24.5 MALFORMATION OF CORONARY VESSELS: ICD-10-CM

## 2021-11-29 PROCEDURE — 99212 OFFICE O/P EST SF 10 MIN: CPT | Mod: 95

## 2021-11-30 PROBLEM — Q24.5 MYOCARDIAL BRIDGE: Status: ACTIVE | Noted: 2021-05-04

## 2021-11-30 RX ORDER — METOPROLOL SUCCINATE 50 MG/1
50 TABLET, EXTENDED RELEASE ORAL
Refills: 0 | Status: ACTIVE | COMMUNITY

## 2021-11-30 RX ORDER — LOSARTAN POTASSIUM 25 MG/1
25 TABLET, FILM COATED ORAL
Refills: 0 | Status: DISCONTINUED | COMMUNITY
End: 2021-11-30

## 2021-12-01 NOTE — HISTORY OF PRESENT ILLNESS
[FreeTextEntry1] : This visit was provided via telehealth using real-time 2-way audio visual technology. The patient, LOVELY REILLY, was located at home, [address] [place] [address], at the time of the visit. The provider was located at [address] [place] [address]. The patient, LOVELY REILLY, SUMIT UGARTE, and Dr. Laura all participated in the telehealth encounter. Verbal consent was obtained on 11/29/2021 by LOVELY REILLY.\par  \par \par 55 year old female, with past medical history of HTN who recently underwent a cardiac Catherization on 11/18/21 who presents for follow up after discharge via telehealth.  \par \par The patient had a cardiac Catherization done on 11/18/21 that revealed no coronory disease, normal pulmonary pressures, and a small myocardial bridge.  The patient was discharged home on post op day 1.\par \par Since discharge, the patient has been feeling well.  She denies chest pain, shortness of breath at rest or with exertion, dizziness, syncope, orthopnea, PND, or LE edema.  \par \par

## 2025-05-16 ENCOUNTER — NON-APPOINTMENT (OUTPATIENT)
Age: 60
End: 2025-05-16

## 2025-09-07 ENCOUNTER — NON-APPOINTMENT (OUTPATIENT)
Age: 60
End: 2025-09-07